# Patient Record
Sex: MALE
[De-identification: names, ages, dates, MRNs, and addresses within clinical notes are randomized per-mention and may not be internally consistent; named-entity substitution may affect disease eponyms.]

---

## 2021-10-29 PROBLEM — Z00.00 ENCOUNTER FOR PREVENTIVE HEALTH EXAMINATION: Status: ACTIVE | Noted: 2021-10-29

## 2021-11-02 ENCOUNTER — APPOINTMENT (OUTPATIENT)
Dept: UROLOGY | Facility: CLINIC | Age: 71
End: 2021-11-02
Payer: MEDICARE

## 2021-11-02 VITALS
WEIGHT: 156 LBS | TEMPERATURE: 98.5 F | HEIGHT: 66 IN | RESPIRATION RATE: 17 BRPM | BODY MASS INDEX: 25.07 KG/M2 | DIASTOLIC BLOOD PRESSURE: 66 MMHG | HEART RATE: 55 BPM | SYSTOLIC BLOOD PRESSURE: 156 MMHG

## 2021-11-02 DIAGNOSIS — R82.90 UNSPECIFIED ABNORMAL FINDINGS IN URINE: ICD-10-CM

## 2021-11-02 PROCEDURE — 99203 OFFICE O/P NEW LOW 30 MIN: CPT

## 2021-11-03 LAB
APPEARANCE: CLEAR
BACTERIA: NEGATIVE
BILIRUBIN URINE: NEGATIVE
BLOOD URINE: NEGATIVE
COLOR: NORMAL
GLUCOSE QUALITATIVE U: NEGATIVE
HYALINE CASTS: 0 /LPF
KETONES URINE: NEGATIVE
LEUKOCYTE ESTERASE URINE: NEGATIVE
MICROSCOPIC-UA: NORMAL
NITRITE URINE: NEGATIVE
PH URINE: 7.5
PROTEIN URINE: NEGATIVE
RED BLOOD CELLS URINE: 0 /HPF
SPECIFIC GRAVITY URINE: 1.01
SQUAMOUS EPITHELIAL CELLS: 0 /HPF
URINE CYTOLOGY: NORMAL
UROBILINOGEN URINE: NORMAL
WHITE BLOOD CELLS URINE: 0 /HPF

## 2021-11-03 NOTE — HISTORY OF PRESENT ILLNESS
[FreeTextEntry1] : HPI: Mr. Randhawa is a 72 yo M who presents today with a lab test from his PCP demonstrating 2+ occult blood. Never any history of gross hematuria. Does have a smoking history 10 cigs for 20-30 years. No issues with urinating. Nocturia x1. During the daytime, urinates about every 3-4 hours, does not feel urgency. Urinary stream is strong. No history of nephrolithiasis. Had CANDIE with PCP. \par \par Anticoagulation: None\par All: NKDA\par Social: 10 cigs a day 10-20 years, no EtOH\par PMHx: None\par FHx: father + smoking history\par PSHx: No prior surgeries.\par Labs: 10/9: Cr 0.7, 2+ occult blood, neg LE/nit, PSA 0.5\par Clearance:\par \par Imaging: None in our system\par  [Urinary Incontinence] : no urinary incontinence [Urinary Retention] : no urinary retention [Urinary Urgency] : no urinary urgency [Urinary Frequency] : no urinary frequency

## 2021-11-04 LAB — BACTERIA UR CULT: NORMAL

## 2021-12-06 ENCOUNTER — APPOINTMENT (OUTPATIENT)
Dept: UROLOGY | Facility: CLINIC | Age: 71
End: 2021-12-06

## 2022-11-08 ENCOUNTER — APPOINTMENT (OUTPATIENT)
Dept: UROLOGY | Facility: CLINIC | Age: 72
End: 2022-11-08

## 2024-08-09 ENCOUNTER — APPOINTMENT (OUTPATIENT)
Dept: ORTHOPEDIC SURGERY | Facility: CLINIC | Age: 74
End: 2024-08-09

## 2024-08-09 PROCEDURE — 73562 X-RAY EXAM OF KNEE 3: CPT | Mod: RT

## 2024-08-09 PROCEDURE — 99203 OFFICE O/P NEW LOW 30 MIN: CPT

## 2024-08-09 NOTE — HISTORY OF PRESENT ILLNESS
[] : yes [de-identified] : Patient is a 74-year-old male presents with a 1-1/2-year history of right knee pain.  Patient is seen with his wife and a Mandarin .  The patient states that the pain got worse about 3 days ago when he Bent down to pick something up with did not fall.  He is complain of pain and swelling.  Has done acupuncture massage in the past.  Is not had any injections or surgery.

## 2024-08-09 NOTE — REASON FOR VISIT
[Spouse] : spouse [FreeTextEntry2] : NP- Right knee pain [Interpreters_IDNumber] : [id number] [Interpreters_FullName] : Ella [FreeTextEntry3] : Mandarin [TWNoteComboBox1] : Other

## 2024-08-09 NOTE — DATA REVIEWED
[FreeTextEntry1] : X-rays right knee December 2023 revealed modest mild osteoarthritic changes in the medial compartment they were not standing x-rays.

## 2024-08-09 NOTE — IMAGING
[Right] : right knee [de-identified] : He is alert and oriented vital signs are stable.  He is ambulating with a cane in his left hand with an antalgic gait.  Emanation of right knee reveals a trace effusion.  Has a varus deformity.  He does have some retropatellar tenderness.  Has a range of motion of 0 to 115 degrees.  He does have medial joint line tenderness no lateral joint tenderness no varus valgus instability and negative Lachman sign and cannot cooperate Hernan's sign.  Is able to straight leg raise.  No calf tenderness.  He has a normal neurologic exam. Normal vascular exam. Normal skin exam. No evidence for open wounds, infection or compartment syndrome.   [FreeTextEntry9] : X-rays right knee 3 views revealed no fracture or dislocations.  There was moderate to severe osteoarthritis in the medial compartment with near bone-on-bone as well as moderate osteoarthritis the patellofemoral region

## 2024-08-09 NOTE — ASSESSMENT
[FreeTextEntry1] : Right knee osteoarthritis  Plan was discussed with the patient and his wife at this time through a Mandarin .  Given the treatment options of Tylenol, Advil or anti-inflammatories with GI precautions ice 20 minutes on 20 minutes off and a course of physical therapy or a cortisone injection.  At this time he declined the cortisone injection would like to try course of conservative treatment physical therapy.  If he does not respond to physical therapy would be candidate for cortisone injection or possible viscosupplementation injections in the future.  I also advised he could be candidate for knee replacement in the future.  All questions were answered to the mandarin  and they are agreeable with the plan.  Patient was advised if they develop any severe pain, numbness or tingling or pain with range of motion they must call or go to the emergency room immediately. All the signs and symptoms of compartment syndrome were explained.  The patient understands.  This medical record was created utilizing Dragon voice recognition software.  This software is not perfect and may occasionally create typographical errors which may be reflected in the above medical record.